# Patient Record
Sex: MALE | Race: OTHER | HISPANIC OR LATINO | URBAN - METROPOLITAN AREA
[De-identification: names, ages, dates, MRNs, and addresses within clinical notes are randomized per-mention and may not be internally consistent; named-entity substitution may affect disease eponyms.]

---

## 2020-04-08 ENCOUNTER — EMERGENCY (EMERGENCY)
Facility: HOSPITAL | Age: 55
LOS: 1 days | Discharge: HOME | End: 2020-04-08
Admitting: EMERGENCY MEDICINE
Payer: COMMERCIAL

## 2020-04-08 VITALS — OXYGEN SATURATION: 96 %

## 2020-04-08 VITALS
HEART RATE: 88 BPM | OXYGEN SATURATION: 96 % | DIASTOLIC BLOOD PRESSURE: 87 MMHG | TEMPERATURE: 98 F | SYSTOLIC BLOOD PRESSURE: 149 MMHG | RESPIRATION RATE: 18 BRPM

## 2020-04-08 PROCEDURE — 99283 EMERGENCY DEPT VISIT LOW MDM: CPT

## 2020-04-08 RX ORDER — IBUPROFEN 200 MG
400 TABLET ORAL ONCE
Refills: 0 | Status: COMPLETED | OUTPATIENT
Start: 2020-04-08 | End: 2020-04-08

## 2020-04-08 RX ADMIN — Medication 30 MILLILITER(S): at 13:11

## 2020-04-08 RX ADMIN — Medication 400 MILLIGRAM(S): at 13:11

## 2020-04-08 NOTE — ED PROVIDER NOTE - PLAN OF CARE
Pt. is a 55 yo male with sore throat for the past 7 days.  Pt. c/o "chest congestion, my chest is irritated, no pain."  Pt. states he has not taken Tyleol "in awhile."  Pt. has decreased appetite.  No headache today but has recently had what he describes as sinus congestion/headache. Pt. is a 53 yo male with sore throat for the past 7 days.  Pt. c/o "chest congestion, my chest is irritated, no pain. sometimes burning."  Pt. states he has not taken Tylenol "in awhile."  Pt. has decreased appetite.  No headache today but has recently had what he describes as sinus congestion/headache. Pt. is a 55 yo male with sore throat for the past 7 days.  Pt. c/o "chest congestion, my chest is irritated, no pain. sometimes burning."  Pt. states he has not taken Tylenol "in awhile."  Pt. has decreased appetite.  No headache today but has recently had what he describes as sinus congestion/headache.  Pt. appears to have epigastric discomfort.  Maalox 30 ml given.  Motrin given for sore throat.  Pt. expressed relief 30 mins. later both for gerd and throat pain.

## 2020-04-08 NOTE — ED PROVIDER NOTE - CLINICAL SUMMARY MEDICAL DECISION MAKING FREE TEXT BOX
Pt. is a 53 yo male with sore throat for the past 7 days.  Pt. c/o "chest congestion, my chest is irritated, no pain. sometimes burning."  Pt. states he has not taken Tylenol "in awhile."  Pt. has decreased appetite.  No headache today but has recently had what he describes as sinus congestion/headache.  Pt. appears to have epigastric discomfort.  Maalox 30 ml given.  Motrin given for sore throat.  Pt. expressed relief 30 mins. later both for gerd and throat pain.  Ambulating O2 sat. is 96%. Pt. is a 55 yo male with sore throat for the past 7 days.  Pt. c/o "chest congestion, my chest is irritated, no pain. sometimes burning."  Pt. states he has not taken Tylenol "in awhile."  Pt. has decreased appetite.  No headache today but has recently had what he describes as sinus congestion/headache.  Pt. appears to have epigastric discomfort.  Maalox 30 ml given.  Motrin given for sore throat.  Pt. expressed relief 30 mins. later both for GERD and throat pain.  Ambulating O2 sat. is 96%.  Instructed pt. that once COVID pandemic is over he should see his PCP to discuss reflux/heartburn.  Instructed him to use at home Tylenol for the throat pain as directed on the bottle and to gargle with warm salty water.  Informed him that he was given Maalox which is sold OTC and that there are other antacids that relieve reflux discomfort.

## 2020-04-08 NOTE — ED PROVIDER NOTE - PATIENT PORTAL LINK FT
You can access the FollowMyHealth Patient Portal offered by [facility] by registering at the following website: [email or web address]. By joining [employer]’s FollowMyHealth portal, you will also be able to view your health information using other applications (apps) compatible with our system.

## 2020-04-08 NOTE — ED PROVIDER NOTE - NSFOLLOWUPINSTRUCTIONS_ED_ALL_ED_FT
You received verbal instructions and did not sign because of the risk of infection, and expressed understanding of the discharge instructions. Please followup with your doctor (call) and consider follow up in his/her office. Please practice good hand hygiene and social distancing. If fever, cough, shortness of breath, or any worse symptoms return to the ER.  If you have symptoms, consideration to quarantine yourself for 14 days from start of symptoms should be considered.  You can call [phone] for any Covid related questions or your local department of health. ([facility] Dept of Health [phone], or [facility]).

## 2020-04-08 NOTE — ED PROVIDER NOTE - CARE PLAN
Assessment and plan of treatment:	Pt. is a 55 yo male with sore throat for the past 7 days.  Pt. c/o "chest congestion, my chest is irritated, no pain."  Pt. states he has not taken Tyleol "in awhile."  Pt. has decreased appetite.  No headache today but has recently had what he describes as sinus congestion/headache. Assessment and plan of treatment:	Pt. is a 53 yo male with sore throat for the past 7 days.  Pt. c/o "chest congestion, my chest is irritated, no pain. sometimes burning."  Pt. states he has not taken Tylenol "in awhile."  Pt. has decreased appetite.  No headache today but has recently had what he describes as sinus congestion/headache. Assessment and plan of treatment:	Pt. is a 53 yo male with sore throat for the past 7 days.  Pt. c/o "chest congestion, my chest is irritated, no pain. sometimes burning."  Pt. states he has not taken Tylenol "in awhile."  Pt. has decreased appetite.  No headache today but has recently had what he describes as sinus congestion/headache.  Pt. appears to have epigastric discomfort.  Maalox 30 ml given.  Motrin given for sore throat.  Pt. expressed relief 30 mins. later both for gerd and throat pain. Principal Discharge DX:	Viral syndrome  Assessment and plan of treatment:	Pt. is a 55 yo male with sore throat for the past 7 days.  Pt. c/o "chest congestion, my chest is irritated, no pain. sometimes burning."  Pt. states he has not taken Tylenol "in awhile."  Pt. has decreased appetite.  No headache today but has recently had what he describes as sinus congestion/headache.  Pt. appears to have epigastric discomfort.  Maalox 30 ml given.  Motrin given for sore throat.  Pt. expressed relief 30 mins. later both for gerd and throat pain.

## 2020-04-08 NOTE — ED PROVIDER NOTE - OBJECTIVE STATEMENT
Pt. is a 53 yo male with sore throat for the past 7 days.  Pt. c/o "chest congestion, my chest is irritated, no pain."  Pt. states he has not taken Tyleol "in awhile."  Pt. has decreased appetite.  No headache today but has recently had what he describes as sinus congestion/headache. Pt. is a 55 yo male with sore throat for the past 7 days.  Pt. c/o "chest congestion, my chest is irritated, no pain., sometimes burning."  Pt. states he has not taken Tyleol "in awhile."  Pt. has decreased appetite.  No headache today but has recently had what he describes as sinus congestion/headache.

## 2020-04-08 NOTE — ED PROVIDER NOTE - ADDITIONAL NOTES AND INSTRUCTIONS:
pt.  has been self quarantined for 6 days.  Told him to do an additional 8 days to complete 14 days.

## 2024-10-22 ENCOUNTER — EMERGENCY (EMERGENCY)
Facility: HOSPITAL | Age: 59
LOS: 1 days | Discharge: HOME | End: 2024-10-22
Attending: STUDENT IN AN ORGANIZED HEALTH CARE EDUCATION/TRAINING PROGRAM | Admitting: STUDENT IN AN ORGANIZED HEALTH CARE EDUCATION/TRAINING PROGRAM
Payer: COMMERCIAL

## 2024-10-22 VITALS
OXYGEN SATURATION: 95 % | HEIGHT: 66 IN | TEMPERATURE: 98 F | WEIGHT: 179.02 LBS | RESPIRATION RATE: 18 BRPM | SYSTOLIC BLOOD PRESSURE: 146 MMHG | HEART RATE: 91 BPM | DIASTOLIC BLOOD PRESSURE: 85 MMHG

## 2024-10-22 LAB
ALBUMIN SERPL ELPH-MCNC: 3.6 G/DL — SIGNIFICANT CHANGE UP (ref 3.3–5)
ALP SERPL-CCNC: 93 U/L — SIGNIFICANT CHANGE UP (ref 40–120)
ALT FLD-CCNC: 39 U/L — SIGNIFICANT CHANGE UP (ref 12–78)
ANION GAP SERPL CALC-SCNC: 7 MMOL/L — SIGNIFICANT CHANGE UP (ref 5–17)
APTT BLD: 30.5 SEC — SIGNIFICANT CHANGE UP (ref 24.5–35.6)
AST SERPL-CCNC: 37 U/L — SIGNIFICANT CHANGE UP (ref 15–37)
BASOPHILS # BLD AUTO: 0.05 K/UL — SIGNIFICANT CHANGE UP (ref 0–0.2)
BASOPHILS NFR BLD AUTO: 1 % — SIGNIFICANT CHANGE UP (ref 0–2)
BILIRUB SERPL-MCNC: 0.4 MG/DL — SIGNIFICANT CHANGE UP (ref 0.2–1.2)
BUN SERPL-MCNC: 16 MG/DL — SIGNIFICANT CHANGE UP (ref 7–23)
CALCIUM SERPL-MCNC: 8.4 MG/DL — LOW (ref 8.5–10.1)
CHLORIDE SERPL-SCNC: 106 MMOL/L — SIGNIFICANT CHANGE UP (ref 96–108)
CO2 SERPL-SCNC: 27 MMOL/L — SIGNIFICANT CHANGE UP (ref 22–31)
CREAT SERPL-MCNC: 1.4 MG/DL — HIGH (ref 0.5–1.3)
EGFR: 58 ML/MIN/1.73M2 — LOW
EOSINOPHIL # BLD AUTO: 0.02 K/UL — SIGNIFICANT CHANGE UP (ref 0–0.5)
EOSINOPHIL NFR BLD AUTO: 0.4 % — SIGNIFICANT CHANGE UP (ref 0–6)
GLUCOSE SERPL-MCNC: 97 MG/DL — SIGNIFICANT CHANGE UP (ref 70–99)
HCT VFR BLD CALC: 45 % — SIGNIFICANT CHANGE UP (ref 39–50)
HGB BLD-MCNC: 15.2 G/DL — SIGNIFICANT CHANGE UP (ref 13–17)
IMM GRANULOCYTES NFR BLD AUTO: 0.6 % — SIGNIFICANT CHANGE UP (ref 0–0.9)
INR BLD: 0.89 RATIO — SIGNIFICANT CHANGE UP (ref 0.85–1.16)
LYMPHOCYTES # BLD AUTO: 1.06 K/UL — SIGNIFICANT CHANGE UP (ref 1–3.3)
LYMPHOCYTES # BLD AUTO: 20.3 % — SIGNIFICANT CHANGE UP (ref 13–44)
MAGNESIUM SERPL-MCNC: 2 MG/DL — SIGNIFICANT CHANGE UP (ref 1.6–2.6)
MCHC RBC-ENTMCNC: 29.5 PG — SIGNIFICANT CHANGE UP (ref 27–34)
MCHC RBC-ENTMCNC: 33.8 GM/DL — SIGNIFICANT CHANGE UP (ref 32–36)
MCV RBC AUTO: 87.4 FL — SIGNIFICANT CHANGE UP (ref 80–100)
MONOCYTES # BLD AUTO: 0.6 K/UL — SIGNIFICANT CHANGE UP (ref 0–0.9)
MONOCYTES NFR BLD AUTO: 11.5 % — SIGNIFICANT CHANGE UP (ref 2–14)
NEUTROPHILS # BLD AUTO: 3.45 K/UL — SIGNIFICANT CHANGE UP (ref 1.8–7.4)
NEUTROPHILS NFR BLD AUTO: 66.2 % — SIGNIFICANT CHANGE UP (ref 43–77)
NRBC # BLD: 0 /100 WBCS — SIGNIFICANT CHANGE UP (ref 0–0)
PLATELET # BLD AUTO: 218 K/UL — SIGNIFICANT CHANGE UP (ref 150–400)
POTASSIUM SERPL-MCNC: 3.8 MMOL/L — SIGNIFICANT CHANGE UP (ref 3.5–5.3)
POTASSIUM SERPL-SCNC: 3.8 MMOL/L — SIGNIFICANT CHANGE UP (ref 3.5–5.3)
PROT SERPL-MCNC: 7.1 G/DL — SIGNIFICANT CHANGE UP (ref 6–8.3)
PROTHROM AB SERPL-ACNC: 10.5 SEC — SIGNIFICANT CHANGE UP (ref 9.9–13.4)
RBC # BLD: 5.15 M/UL — SIGNIFICANT CHANGE UP (ref 4.2–5.8)
RBC # FLD: 13.2 % — SIGNIFICANT CHANGE UP (ref 10.3–14.5)
SODIUM SERPL-SCNC: 140 MMOL/L — SIGNIFICANT CHANGE UP (ref 135–145)
TROPONIN I, HIGH SENSITIVITY RESULT: 33.4 NG/L — SIGNIFICANT CHANGE UP
WBC # BLD: 5.21 K/UL — SIGNIFICANT CHANGE UP (ref 3.8–10.5)
WBC # FLD AUTO: 5.21 K/UL — SIGNIFICANT CHANGE UP (ref 3.8–10.5)

## 2024-10-22 PROCEDURE — 70498 CT ANGIOGRAPHY NECK: CPT | Mod: 26,MC

## 2024-10-22 PROCEDURE — 93005 ELECTROCARDIOGRAM TRACING: CPT

## 2024-10-22 PROCEDURE — 70450 CT HEAD/BRAIN W/O DYE: CPT | Mod: 26,MC,59

## 2024-10-22 PROCEDURE — 70496 CT ANGIOGRAPHY HEAD: CPT | Mod: 26,MC

## 2024-10-22 PROCEDURE — 72125 CT NECK SPINE W/O DYE: CPT | Mod: MC

## 2024-10-22 PROCEDURE — 93010 ELECTROCARDIOGRAM REPORT: CPT

## 2024-10-22 PROCEDURE — 99285 EMERGENCY DEPT VISIT HI MDM: CPT

## 2024-10-22 PROCEDURE — 36415 COLL VENOUS BLD VENIPUNCTURE: CPT

## 2024-10-22 PROCEDURE — 70496 CT ANGIOGRAPHY HEAD: CPT | Mod: MC

## 2024-10-22 PROCEDURE — 84484 ASSAY OF TROPONIN QUANT: CPT

## 2024-10-22 PROCEDURE — 70450 CT HEAD/BRAIN W/O DYE: CPT | Mod: MC

## 2024-10-22 PROCEDURE — 80053 COMPREHEN METABOLIC PANEL: CPT

## 2024-10-22 PROCEDURE — 85025 COMPLETE CBC W/AUTO DIFF WBC: CPT

## 2024-10-22 PROCEDURE — 85730 THROMBOPLASTIN TIME PARTIAL: CPT

## 2024-10-22 PROCEDURE — 70498 CT ANGIOGRAPHY NECK: CPT | Mod: MC

## 2024-10-22 PROCEDURE — 85610 PROTHROMBIN TIME: CPT

## 2024-10-22 PROCEDURE — 99285 EMERGENCY DEPT VISIT HI MDM: CPT | Mod: 25

## 2024-10-22 PROCEDURE — 83735 ASSAY OF MAGNESIUM: CPT

## 2024-10-22 PROCEDURE — 72125 CT NECK SPINE W/O DYE: CPT | Mod: 26,MC

## 2024-10-22 RX ORDER — MECLIZINE HYDROCLORIDE 25 MG/1
1 TABLET ORAL
Qty: 21 | Refills: 0
Start: 2024-10-22 | End: 2024-10-28

## 2024-10-22 RX ORDER — MECLIZINE HYDROCLORIDE 25 MG/1
25 TABLET ORAL ONCE
Refills: 0 | Status: COMPLETED | OUTPATIENT
Start: 2024-10-22 | End: 2024-10-22

## 2024-10-22 RX ORDER — SODIUM CHLORIDE IRRIG SOLUTION 0.9 %
1000 SOLUTION, IRRIGATION IRRIGATION ONCE
Refills: 0 | Status: COMPLETED | OUTPATIENT
Start: 2024-10-22 | End: 2024-10-22

## 2024-10-22 RX ADMIN — Medication 1000 MILLILITER(S): at 23:26

## 2024-10-22 RX ADMIN — MECLIZINE HYDROCLORIDE 25 MILLIGRAM(S): 25 TABLET ORAL at 22:04

## 2024-10-22 NOTE — ED ADULT TRIAGE NOTE - CHIEF COMPLAINT QUOTE
I was twisting my neck and felt very dizzy w/ hx of vertigo. I felt like I was going to pass out and caught myself on a wall.

## 2024-10-22 NOTE — ED PROVIDER NOTE - PATIENT PORTAL LINK FT
You can access the FollowMyHealth Patient Portal offered by [facility] by registering at the following website: [email or web address]. By joining [employer]’s FollowMyHealth portal, you will also be able to view your health information using other applications (apps) compatible with our system.

## 2024-10-22 NOTE — ED PROVIDER NOTE - CLINICAL SUMMARY MEDICAL DECISION MAKING FREE TEXT BOX
With acute onset dizziness which occurred neck/head movement.  Differential inclusive of vertigo, electrolyte abnormality, CVA, vascular issue.  Check labs, EKG, treat symptoms, CT CTA, reevaluate.  Consider admission for neuro eval/MRI.

## 2024-10-22 NOTE — ED PROVIDER NOTE - NIH STROKE SCALE: 10. DYSARTHRIA, QM
The patient is able to hear and make appropriate eye contact  Suspect the refusal to speak is volitional, not organic     (0) Normal

## 2024-10-22 NOTE — ED PROVIDER NOTE - CARE PROVIDER_API CALL
Jackeline Craig  Neurology  [address] [place] [address]  Phone: [phone]  Fax: [phone]  Follow Up Time: 4-6 Days

## 2024-10-22 NOTE — ED ADULT TRIAGE NOTE - PRO INTERPRETER NEED 2
Detail Level: Zone Quality 226: Preventive Care And Screening: Tobacco Use: Screening And Cessation Intervention: Patient screened for tobacco use, is a smoker AND received Cessation Counseling English

## 2024-10-22 NOTE — ED PROVIDER NOTE - NSFOLLOWUPINSTRUCTIONS_ED_ALL_ED_FT
Please follow up with your Primary Care Physician and any specialists as discussed.  Please take your medications as prescribed and or instructed.  If your symptoms persist or worsen, please seek care. Either return to the Emergency Department, go to urgent care or see your primary care doctor.  Please refer to general information and instructions attached or below:     Dizziness    WHAT YOU NEED TO KNOW:    Dizziness is a feeling of being off balance or unsteady. Common causes of dizziness are an inner ear fluid imbalance or a lack of oxygen in your blood. Dizziness may be acute (lasts 3 days or less) or chronic (lasts longer than 3 days). You may have dizzy spells that last from seconds to a few hours.     DISCHARGE INSTRUCTIONS:    Return to the emergency department if:     You have a headache and a stiff neck.      You have shaking chills and a fever.       You vomit over and over with no relief.       Your vomit or bowel movements are red or black.       You have pain in your chest, back, or abdomen.       You have numbness, especially in your face, arms, or legs.       You have trouble moving your arms or legs.       You are confused.     Contact your healthcare provider if:     You have a fever.       Your symptoms do not get better with treatment.       You have questions or concerns about your condition or care.     Manage your symptoms:     Do not drive or operate heavy machinery when you are dizzy.       Get up slowly from sitting or lying down.       Drink plenty of liquids. Liquids help prevent dehydration. Ask how much liquid to drink each day and which liquids are best for you.    Follow up with your healthcare provider as directed: Write down your questions so you remember to ask them during your visits. Please follow up with your Primary Care Physician and any specialists as discussed- Neurology and Employee health.  Please take your medications as prescribed and or instructed.  If your symptoms persist or worsen, please seek care. Either return to the Emergency Department, go to urgent care or see your primary care doctor.  Please refer to general information and instructions attached or below:     Dizziness    WHAT YOU NEED TO KNOW:    Dizziness is a feeling of being off balance or unsteady. Common causes of dizziness are an inner ear fluid imbalance or a lack of oxygen in your blood. Dizziness may be acute (lasts 3 days or less) or chronic (lasts longer than 3 days). You may have dizzy spells that last from seconds to a few hours.     DISCHARGE INSTRUCTIONS:    Return to the emergency department if:     You have a headache and a stiff neck.      You have shaking chills and a fever.       You vomit over and over with no relief.       Your vomit or bowel movements are red or black.       You have pain in your chest, back, or abdomen.       You have numbness, especially in your face, arms, or legs.       You have trouble moving your arms or legs.       You are confused.     Contact your healthcare provider if:     You have a fever.       Your symptoms do not get better with treatment.       You have questions or concerns about your condition or care.     Manage your symptoms:     Do not drive or operate heavy machinery when you are dizzy.       Get up slowly from sitting or lying down.       Drink plenty of liquids. Liquids help prevent dehydration. Ask how much liquid to drink each day and which liquids are best for you.    Follow up with your healthcare provider as directed: Write down your questions so you remember to ask them during your visits.

## 2024-10-22 NOTE — ED ADULT NURSE NOTE - OBJECTIVE STATEMENT
59 y.o male with pmhx of chronic back pain and vertigo presents to ED with chief complaint of dizziness. Pt states while at work they had an episode of dizziness and near syncope. Pt denies any fall or LOC stating they were able to catch themselves on a wall. Pt denies any pain at this time.

## 2024-10-22 NOTE — ED PROVIDER NOTE - PROGRESS NOTE DETAILS
Patient feels better, still without dizziness.  Results discussed with patient and wife at bedside.  Given mild elevated creatinine will give a liter of LR.  Will give prescription for meclizine and neurology follow-up.

## 2024-10-22 NOTE — ED PROVIDER NOTE - PHYSICAL EXAMINATION
Vital signs as available reviewed.  General:  No acute distress.  Head:  Normocephalic, atraumatic.  Eyes:  Conjunctiva pink, no icterus.  Cardiovascular:  Regular rate, no obvious murmur.  Respiratory:  Clear to auscultation, good air entry bilaterally.  Abdomen:  Soft, non-tender.  Musculoskeletal:  No obvious deformity.  Neurologic: Alert and oriented, follows commands, extra-occular movements intact, no visual fields defect, no facial asymmetry, no upper extremity drift, no lower extremity drift, rapid alternating movements performed without difficulty, no sensory deficit, no aphasia, no dysarthria, no inattention. nih=0  Skin:  Warm and dry.

## 2024-10-22 NOTE — ED PROVIDER NOTE - OBJECTIVE STATEMENT
59-year-old male history of arthritis and spinal here complaining of dizziness.  Patient was emptying the trash at work when he moved his neck developed dizziness described as spinning but also like he might pass out.  Patient was stabilized by his coworkers did not fall or hurt himself.  No associated nausea or vomiting.  No headache. Patient reports she has had vertigo in the past although it was a long time ago and was given medications which helped.

## 2024-10-23 VITALS
SYSTOLIC BLOOD PRESSURE: 123 MMHG | DIASTOLIC BLOOD PRESSURE: 75 MMHG | TEMPERATURE: 98 F | RESPIRATION RATE: 18 BRPM | HEART RATE: 73 BPM | OXYGEN SATURATION: 98 %

## 2024-10-23 PROBLEM — R51 HEADACHE: Chronic | Status: ACTIVE | Noted: 2020-04-08

## 2024-12-29 NOTE — ED ADULT NURSE NOTE - SKIN TEMPERATURE
Gallardo placed today AM by urology. Urine output via gallardo 50 mL for 8 hours. Gallardo draining well with manual irrigation. Notified urology and medicine. No new orders at this time   warm